# Patient Record
Sex: MALE | Race: WHITE | URBAN - METROPOLITAN AREA
[De-identification: names, ages, dates, MRNs, and addresses within clinical notes are randomized per-mention and may not be internally consistent; named-entity substitution may affect disease eponyms.]

---

## 2018-05-03 ENCOUNTER — TELEPHONE (OUTPATIENT)
Dept: OTHER | Facility: CLINIC | Age: 63
End: 2018-05-03

## 2018-05-03 NOTE — TELEPHONE ENCOUNTER
5/3/2018    Call Regarding Onboarding ARE CHOICES     Attempt 1    Message on voicemail     Comments:           Outreach   AT

## 2018-07-26 NOTE — TELEPHONE ENCOUNTER
7/26/2018    Call Regarding Onboarding are Choices    Attempt 2    Message on voicemail     Comments: 0 DEP      Outreach   CC

## 2018-08-07 NOTE — TELEPHONE ENCOUNTER
8/7/2018    Call Regarding Onboarding are Choices    Attempt 3    Message on voicemail     Comments:       Outreach   Blanca Hines